# Patient Record
Sex: FEMALE | Race: BLACK OR AFRICAN AMERICAN | NOT HISPANIC OR LATINO | URBAN - METROPOLITAN AREA
[De-identification: names, ages, dates, MRNs, and addresses within clinical notes are randomized per-mention and may not be internally consistent; named-entity substitution may affect disease eponyms.]

---

## 2019-02-22 ENCOUNTER — EMERGENCY (EMERGENCY)
Facility: HOSPITAL | Age: 54
LOS: 1 days | Discharge: HOME | End: 2019-02-22
Attending: EMERGENCY MEDICINE | Admitting: EMERGENCY MEDICINE
Payer: MEDICARE

## 2019-02-22 VITALS
OXYGEN SATURATION: 99 % | TEMPERATURE: 99 F | SYSTOLIC BLOOD PRESSURE: 126 MMHG | DIASTOLIC BLOOD PRESSURE: 84 MMHG | RESPIRATION RATE: 16 BRPM | HEART RATE: 80 BPM

## 2019-02-22 VITALS
DIASTOLIC BLOOD PRESSURE: 83 MMHG | HEART RATE: 80 BPM | RESPIRATION RATE: 16 BRPM | WEIGHT: 132.28 LBS | TEMPERATURE: 98 F | OXYGEN SATURATION: 99 % | SYSTOLIC BLOOD PRESSURE: 123 MMHG

## 2019-02-22 DIAGNOSIS — R51 HEADACHE: ICD-10-CM

## 2019-02-22 DIAGNOSIS — Z88.5 ALLERGY STATUS TO NARCOTIC AGENT: ICD-10-CM

## 2019-02-22 DIAGNOSIS — F41.9 ANXIETY DISORDER, UNSPECIFIED: ICD-10-CM

## 2019-02-22 DIAGNOSIS — Z79.899 OTHER LONG TERM (CURRENT) DRUG THERAPY: ICD-10-CM

## 2019-02-22 PROCEDURE — 99284 EMERGENCY DEPT VISIT MOD MDM: CPT

## 2019-02-22 PROCEDURE — 99283 EMERGENCY DEPT VISIT LOW MDM: CPT

## 2019-02-22 NOTE — ED PROVIDER NOTE - CLINICAL SUMMARY MEDICAL DECISION MAKING FREE TEXT BOX
Pt c/o anxiety and difficulty coping w legal issues, no si/hi/hallucinations.  Pt requesting letter to delay her court date.  I explained that we could not provide a legal document requesting a court delay, but I did provide a letter stating the pt was in the ed tonight and was asking for a delay.  The letter requested the court consider postponing her court date until she could see her outpt provider.

## 2019-02-22 NOTE — ED ADULT NURSE NOTE - NSFALLRSKHARMRISK_ED_ALL_ED
Pt d/c 09/19 to [facility].       09/21/17 1000   Discharge disposition   Discharged to: Skilled Nurs   Name of [address]   Patient is Discharged to a [address] [place] Yes   Discharge transportation [employer] no

## 2019-02-22 NOTE — ED PROVIDER NOTE - NSFOLLOWUPCLINICS_GEN_ALL_ED_FT
[facility]  Psychiatry  [address] [place] [address]  Phone: [phone]  Fax:   Follow Up Time:
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
## 2019-02-22 NOTE — ED PROVIDER NOTE - PROGRESS NOTE DETAILS
Pt discussed w psych - they feel pt needs to obtain her letter from her typical psych providers or outpt psych clinic. Pt discussed w psych - they feel pt needs to obtain her letter from her typical psych providers or outpt psych clinic.  Will dc w letter stating pt in ed and that pt is requesting delay in her court date until she can connect w her outpt providers.

## 2019-02-22 NOTE — ED ADULT TRIAGE NOTE - CHIEF COMPLAINT QUOTE
Pt states " I would like to see a psychiatrist, I am very angry". Pt explains she has hx of TBI, currently on disability , going through a legal process with her landlord right now, and today had gotten anxious and angry over something. Denies SI or HI. States her psychologist is away. Pt is cooperative in triage.

## 2019-02-22 NOTE — ED PROVIDER NOTE - OBJECTIVE STATEMENT
54 yo female h/o tbi and resulting cognitive issues, anxiety c/o feeling anxious and close to reaching her limit which would make her yell at people.  Pt more stressed 2/2 a court case related to her bldg which is adversely affecting her mood and coping.  Pt attends cognitive therapy and sees a psychologist; she takes alprazolam prn.  Pt's provider is out of town until 2/28 and she has a court appearance 2/25; pt is requesting a letter stating she must delay her court case until she feels more mentally calm and prepared.  No si, hi, hallucinations.  Pt notes occ ha when she is stressed, none now.  No other physical complaints.

## 2019-02-22 NOTE — ED ADULT NURSE NOTE - NS ED NURSE LEVEL OF CONSCIOUSNESS SPEECH
Date of Service: 10/23/2017    HISTORY OF PRESENT ILLNESS:  The patient is here to follow up on CPAP and also to discuss mood and things.    She has a history of recent sleep study.  She now has been placed on CPAP.  She is tolerating it fairly well.  She still does not feel like she sleeps or gets good rest.  She is less tired but just does not have the energy.  Overall, she feels good with CPAP.  It is not so much sleepiness during the daytime as it is just fatigue.    She brings in her log from her Fitbit.  She only goes into REM sleep through the night for about 1-1.5 hours.  She is in deep sleep only 30-60 minutes.  Light sleep for the rest of the time 5-7 hours.    She has been taking an over-the-counter sleep aid, which I believe is Benadryl.    She reports that some of this is just related to mood.    She has had some increasing generalized achiness consistent with fibromyalgia, just seems very sensitive to touch.    She is still also struggling with right knee pain after her injury.    She has had a little bit of increasing mood change.    She has had a bad couple of days.  A friend of hers is a resident at [facility] and Sherri has been visiting her more.  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    She has had some increasing arm pain as well.      Sometimes it actually hurts to take a deep breath.    She reports she has had no symptoms of fibrillation since she has been on CPAP.    She does use Diazepam from time to time for muscle spasms.  She tends to avoid it because it will bring her down.    She continues on her other medications as documented including her Sertraline for mood.    She is using Hydrocodone intermittently for pain.    PAST MEDICAL, SURGICAL, SOCIAL HISTORY, ALLERGIES AND ACTIVE MEDICATIONS:  Reviewed.    REVIEW OF SYSTEMS:  No significant acid reflux, on Pantoprazole.   Lower extremity edema is absent.    She does get a little bit of bruising but has had no other bleeding complications from Eliquis.  Denies melena, hematochezia or hematuria.    She continues on Diclofenac.  She tolerates it well without dyspepsia.  She just does not feel that she can really get along well without that despite being on the Eliquis.  She is aware of the risk of ulceration and GI bleeding.  She has never had dyspepsia or GI bleeding despite being on NSAIDs for quite some time.  She is on Pantoprazole as noted above.    PHYSICAL EXAMINATION:  GENERAL:  Pleasant female in no distress.  She appears her stated age.  Her affect is flat.  Mood is not depressed.  She appears slightly fatigued.  HEENT:  No oral pallor or mucositis.  Nares and airway patent.  Eyes show no proptosis.  No lid lag or swelling.  LUNGS:  Clear to auscultation and symmetrical.  HEART:  Regular rate and rhythm.  I did not appreciate a gallop rhythm.  There is a soft 1/6 systolic ejection murmur that is early in the systolic phase.  No diastolic murmurs.  EXTREMITIES:  No clubbing, cyanosis or edema in the upper extremities.  Lower extremities show trace dependent pitting edema, nontender to the legs.  Multiple tender points about the arms, legs, chest and back are noted.    IMPRESSION:  1.  Obstructive sleep apnea on CPAP.  2.  Hypersomnia.  3.  Dysthymia.  4.  Fibromyalgia syndrome.    PLAN:  Medications are reviewed in detail.  She may continue with her current medications as listed.  Once again reviewed the risk of Diclofenac.    Reviewed sleep apnea.    Reviewed her download from 09/17/2017.  Average pressure was 7.1 cm of water.  Her AHI was 1.3.    I reviewed her Fitbit log.  I am not sure exactly how accurate this is with regard to REM sleep, light sleep and deep sleep.  We may want to get another download in a few months as she adjusts to everything.      We discussed fibromyalgia type pain.  Reviewed exercise.    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    Will try her on Zolpidem.  We can see if getting better sleep improves her Fitbit log.    Reviewed depression.  Continue to work with a counselor.  Perhaps better sleep will help.    I reviewed her knee pain and knee injury as well.  We discussed Kinesio taping for the patellar tilt problem.    I would like to see her back for a followup visit in 4-6 weeks.      Dictated By: Miki Oscar DO  Signing Provider: DO SHASHI Ye/msc ([id number])  DD: 11/13/2017 07:19:00 TD: 11/13/2017 14:25:35    Copy Sent To:    Speaking Coherently

## 2019-02-22 NOTE — ED PROVIDER NOTE - NSFOLLOWUPINSTRUCTIONS_ED_ALL_ED_FT
Anxiety    Please follow up with your psychologist for further documentation for your court appearances.

## 2019-02-22 NOTE — ED ADULT NURSE NOTE - HPI (INCLUDE ILLNESS QUALITY, SEVERITY, DURATION, TIMING, CONTEXT, MODIFYING FACTORS, ASSOCIATED SIGNS AND SYMPTOMS)
Pt seeking doctors note to accompany her request to postpone upcoming court date d/t recent development of angry outbursts

## 2019-02-23 RX ORDER — ALPRAZOLAM 0.25 MG
0 TABLET ORAL
Qty: 0 | Refills: 0 | COMMUNITY

## 2021-04-28 ENCOUNTER — APPOINTMENT (OUTPATIENT)
Age: 56
End: 2021-04-28
Payer: COMMERCIAL

## 2021-04-28 PROBLEM — S06.9X9A UNSPECIFIED INTRACRANIAL INJURY WITH LOSS OF CONSCIOUSNESS OF UNSPECIFIED DURATION, INITIAL ENCOUNTER: Chronic | Status: ACTIVE | Noted: 2019-02-23

## 2021-04-28 PROBLEM — F41.9 ANXIETY DISORDER, UNSPECIFIED: Chronic | Status: ACTIVE | Noted: 2019-02-23

## 2021-04-28 PROCEDURE — 0001A: CPT

## 2021-05-19 ENCOUNTER — APPOINTMENT (OUTPATIENT)
Age: 56
End: 2021-05-19
Payer: COMMERCIAL

## 2021-05-19 PROCEDURE — 0002A: CPT

## 2022-09-16 NOTE — ED ADULT NURSE NOTE - CAS TRG GEN SKIN COLOR
69 yo F w choledocolithiasis referred for procedure.  Will consult surgery and GI,  send pre op labs.  admit for intervention. Normal for race

## 2023-03-16 ENCOUNTER — APPOINTMENT (OUTPATIENT)
Dept: GASTROENTEROLOGY | Facility: CLINIC | Age: 58
End: 2023-03-16
Payer: MEDICARE

## 2023-03-16 ENCOUNTER — APPOINTMENT (OUTPATIENT)
Dept: ULTRASOUND IMAGING | Facility: CLINIC | Age: 58
End: 2023-03-16
Payer: MEDICARE

## 2023-03-16 ENCOUNTER — OUTPATIENT (OUTPATIENT)
Dept: OUTPATIENT SERVICES | Facility: HOSPITAL | Age: 58
LOS: 1 days | End: 2023-03-16
Payer: MEDICARE

## 2023-03-16 VITALS
HEART RATE: 78 BPM | SYSTOLIC BLOOD PRESSURE: 124 MMHG | BODY MASS INDEX: 26.8 KG/M2 | DIASTOLIC BLOOD PRESSURE: 82 MMHG | WEIGHT: 157 LBS | OXYGEN SATURATION: 99 % | TEMPERATURE: 96.8 F | HEIGHT: 64 IN

## 2023-03-16 DIAGNOSIS — Z82.49 FAMILY HISTORY OF ISCHEMIC HEART DISEASE AND OTHER DISEASES OF THE CIRCULATORY SYSTEM: ICD-10-CM

## 2023-03-16 DIAGNOSIS — Z87.42 PERSONAL HISTORY OF OTHER DISEASES OF THE FEMALE GENITAL TRACT: ICD-10-CM

## 2023-03-16 DIAGNOSIS — Z00.00 ENCOUNTER FOR GENERAL ADULT MEDICAL EXAMINATION W/OUT ABNORMAL FINDINGS: ICD-10-CM

## 2023-03-16 DIAGNOSIS — Z00.8 ENCOUNTER FOR OTHER GENERAL EXAMINATION: ICD-10-CM

## 2023-03-16 PROCEDURE — 76700 US EXAM ABDOM COMPLETE: CPT

## 2023-03-16 PROCEDURE — 76700 US EXAM ABDOM COMPLETE: CPT | Mod: 26

## 2023-03-16 PROCEDURE — 99203 OFFICE O/P NEW LOW 30 MIN: CPT

## 2023-03-16 RX ORDER — GABAPENTIN 300 MG
300 TABLET ORAL
Refills: 0 | Status: ACTIVE | COMMUNITY

## 2023-03-16 RX ORDER — HYDROXYZINE PAMOATE 50 MG/1
50 CAPSULE ORAL
Refills: 0 | Status: ACTIVE | COMMUNITY

## 2023-03-16 RX ORDER — ALPRAZOLAM 0.5 MG/1
0.5 TABLET ORAL
Refills: 0 | Status: ACTIVE | COMMUNITY

## 2023-03-16 RX ORDER — SODIUM SULFATE, POTASSIUM SULFATE AND MAGNESIUM SULFATE 1.6; 3.13; 17.5 G/177ML; G/177ML; G/177ML
17.5-3.13-1.6 SOLUTION ORAL
Qty: 1 | Refills: 0 | Status: ACTIVE | COMMUNITY
Start: 2023-03-16 | End: 1900-01-01

## 2023-03-16 NOTE — ASSESSMENT
[FreeTextEntry1] : Impression and plan\par Patient came to the office today to arrange for colonoscopy.  The risks benefits alternatives and limitations were discussed.  Call back in the interim if any new issues or complaints.  Further suggestions will follow.

## 2023-03-16 NOTE — HISTORY OF PRESENT ILLNESS
[FreeTextEntry1] : Patient came to the office today to arrange for colonoscopy.  It has been about 5 years since her last examination.  Patient experiences occasional constipation and has had recent change in bowel movements possibly related to intermittent Latter-day fasting.  The patient denies current nausea vomiting fever chills rectal bleeding or melena.  She has no cardiac or pulmonary complaints.

## 2023-04-18 ENCOUNTER — APPOINTMENT (OUTPATIENT)
Dept: GASTROENTEROLOGY | Facility: AMBULATORY MEDICAL SERVICES | Age: 58
End: 2023-04-18
Payer: MEDICARE

## 2023-04-18 PROCEDURE — 45378 DIAGNOSTIC COLONOSCOPY: CPT | Mod: PT
